# Patient Record
Sex: MALE | Race: AMERICAN INDIAN OR ALASKA NATIVE
[De-identification: names, ages, dates, MRNs, and addresses within clinical notes are randomized per-mention and may not be internally consistent; named-entity substitution may affect disease eponyms.]

---

## 2019-10-13 ENCOUNTER — HOSPITAL ENCOUNTER (EMERGENCY)
Dept: HOSPITAL 5 - ED | Age: 49
Discharge: LEFT BEFORE DISCHARGE | End: 2019-10-13
Payer: COMMERCIAL

## 2019-10-13 VITALS — DIASTOLIC BLOOD PRESSURE: 81 MMHG | SYSTOLIC BLOOD PRESSURE: 127 MMHG

## 2019-10-13 DIAGNOSIS — F17.200: ICD-10-CM

## 2019-10-13 DIAGNOSIS — F15.10: ICD-10-CM

## 2019-10-13 DIAGNOSIS — Z98.890: ICD-10-CM

## 2019-10-13 DIAGNOSIS — R07.89: Primary | ICD-10-CM

## 2019-10-13 DIAGNOSIS — F12.10: ICD-10-CM

## 2019-10-13 DIAGNOSIS — E11.9: ICD-10-CM

## 2019-10-13 LAB
BASOPHILS # (AUTO): 0 K/MM3 (ref 0–0.1)
BASOPHILS NFR BLD AUTO: 0.6 % (ref 0–1.8)
BUN SERPL-MCNC: 18 MG/DL (ref 9–20)
BUN/CREAT SERPL: 18 %
CALCIUM SERPL-MCNC: 9.3 MG/DL (ref 8.4–10.2)
EOSINOPHIL # BLD AUTO: 0.1 K/MM3 (ref 0–0.4)
EOSINOPHIL NFR BLD AUTO: 2.4 % (ref 0–4.3)
HCT VFR BLD CALC: 46.7 % (ref 35.5–45.6)
HEMOLYSIS INDEX: 17
HGB BLD-MCNC: 15.9 GM/DL (ref 11.8–15.2)
LYMPHOCYTES # BLD AUTO: 2.4 K/MM3 (ref 1.2–5.4)
LYMPHOCYTES NFR BLD AUTO: 38.8 % (ref 13.4–35)
MCHC RBC AUTO-ENTMCNC: 34 % (ref 32–34)
MCV RBC AUTO: 94 FL (ref 84–94)
MONOCYTES # (AUTO): 0.4 K/MM3 (ref 0–0.8)
MONOCYTES % (AUTO): 6.8 % (ref 0–7.3)
PLATELET # BLD: 295 K/MM3 (ref 140–440)
RBC # BLD AUTO: 4.98 M/MM3 (ref 3.65–5.03)

## 2019-10-13 PROCEDURE — 93005 ELECTROCARDIOGRAM TRACING: CPT

## 2019-10-13 PROCEDURE — 36415 COLL VENOUS BLD VENIPUNCTURE: CPT

## 2019-10-13 PROCEDURE — 83735 ASSAY OF MAGNESIUM: CPT

## 2019-10-13 PROCEDURE — 82550 ASSAY OF CK (CPK): CPT

## 2019-10-13 PROCEDURE — 71046 X-RAY EXAM CHEST 2 VIEWS: CPT

## 2019-10-13 PROCEDURE — 84484 ASSAY OF TROPONIN QUANT: CPT

## 2019-10-13 PROCEDURE — 80048 BASIC METABOLIC PNL TOTAL CA: CPT

## 2019-10-13 PROCEDURE — 93010 ELECTROCARDIOGRAM REPORT: CPT

## 2019-10-13 PROCEDURE — 85025 COMPLETE CBC W/AUTO DIFF WBC: CPT

## 2019-10-13 NOTE — XRAY REPORT
CHEST PA AND LATERAL VIEWS



INDICATION: 

Chest Pain.



COMPARISON: 

None.



FINDINGS:



Support devices: None.



Heart: Within normal limits. 



Lungs/Pleura: No acute pulmonary or pleural findings.  







IMPRESSION:

1. No significant abnormality.



Signer Name: Jesus Ridley MD 

Signed: 10/13/2019 6:45 PM

 Workstation Name: [id number]

## 2019-10-13 NOTE — EVENT NOTE
ED Screening Note


Date of service: 10/13/19


Time: 18:19


ED Screening Note: 





This is a 49 y.o. M. that presents to the ER with left sided chest pain x 1 

week.





Denies radiating pain, palpitations, sob, n/v, or weakness





This initial assessment/diagnostic orders/clinical plan/treatment(s) is/are s

ubject to change based on patients health status, clinical progression and re-

assessment by fellow clinical providers in the ED. Further treatment and workup 

at subsequent clinical providers discretion. Patient/guardian urged not to elope

from the ED as their condition may be serious if not clinically assessed and 

managed. 





Initial orders include: 





Labs, EKG, & CXR

## 2019-10-13 NOTE — EMERGENCY DEPARTMENT REPORT
ED Chest Pain HPI





- General


Chief Complaint: Chest Pain


Stated Complaint: CHEST PAIN


Time Seen by Provider: 10/13/19 18:19


Source: patient, RN notes reviewed


Mode of arrival: Ambulatory


Limitations: No Limitations





- History of Present Illness


Initial Comments: 





This is a 49-year-old gentleman.  This patient is not known to this provider 

previously.  He does not have a local primary care doctor.  He reports a history

of left nephrectomy when he was a child.  He presents to the ER with a complaint

of nontraumatic chest pain.  The chest pain is left-sided.  It does not radiate 

anywhere.  It is intermittent.  It is present for over a week.  There is no 

radiation to the back, arms or neck.  There is no vomiting or diaphoresis.  It 

is no recent aspirin consumption.  Patient works as a .  He endorses

multiple recent long road trips.  There is no leg pain or leg swelling.  He 

admits to recreational meth use a few days ago.  He denies recent drug 

ingestion.





Is no family history of heart disease that he is aware of.  There is no history 

of DVT or pulmonary embolism that he is aware of.





Apparently, patient came in today because he had a break in travel.


MD Complaint: chest pain


-: Gradual


Onset: during rest


Pain Location: left chest


Pain Radiation: none


Severity: mild


Quality: aching


Consistency: intermittent


Improves With: nothing


Worsens With: nothing


Context: recent travel


Aspirin use within the Past 7 Days: (0) No





- Related Data


                                    Allergies











Allergy/AdvReac Type Severity Reaction Status Date / Time


 


No Known Allergies Allergy   Unverified 10/13/19 18:06














Heart Score





- HEART Score


History: Slightly suspicious


EKG: Non-specific


Age: 45-65


Risk factors: 1-2 risk factors


Troponin: < normal limit


HEART Score: 3





- Critical Actions


Critical Actions: 0-3 pts:0.9-1.7%risk of adverse cardiac event.Candidate for 

discharge





ED Review of Systems


ROS: 


Stated complaint: CHEST PAIN


Other details as noted in HPI





Constitutional: denies: fever


Eyes: denies: eye discharge


ENT: denies: congestion


Respiratory: denies: cough, shortness of breath, SOB with exertion, SOB at rest,

wheezing


Cardiovascular: chest pain


Gastrointestinal: denies: vomiting


Musculoskeletal: denies: back pain


Skin: denies: lesions


Neurological: denies: weakness


Psychiatric: denies: anxiety


Hematological/Lymphatic: denies: easy bleeding





ED Past Medical Hx





- Past Medical History


Previous Medical History?: Yes


Hx Diabetes: Yes


Additional medical history: One kidney





- Surgical History


Past Surgical History?: Yes


Additional Surgical History: Left nephrectomy





- Social History


Smoking Status: Current Every Day Smoker


Substance Use Type: Marijuana, Methamphetamines





ED Physical Exam





- General


Limitations: No Limitations


General appearance: alert, in no apparent distress





- Head


Head exam: Present: atraumatic, normocephalic





- Eye


Eye exam: Present: normal appearance, EOMI.  Absent: nystagmus





- ENT


ENT exam: Present: normal exam, normal orophraynx, mucous membranes moist, 

normal external ear exam





- Neck


Neck exam: Present: normal inspection, full ROM.  Absent: tenderness, menin

gismus





- Respiratory


Respiratory exam: Present: normal lung sounds bilaterally.  Absent: respiratory 

distress





- Cardiovascular


Cardiovascular Exam: Present: regular rate, normal rhythm, normal heart sounds. 

Absent: bradycardia, tachycardia, irregular rhythm, systolic murmur, diastolic 

murmur, rubs, gallop





- GI/Abdominal


GI/Abdominal exam: Present: soft.  Absent: distended, tenderness, guarding, 

rebound, rigid, pulsatile mass





- Rectal


Rectal exam: Present: deferred





- Extremities Exam


Extremities exam: Present: normal inspection, full ROM, other (2+ pulses noted 

in the bilateral upper, lower extremities.  There is no long bone tenderness.  

Musculoskeletal compartments are soft.  The pelvis is stable.).  Absent: pedal 

edema, joint swelling, calf tenderness





- Back Exam


Back exam: Present: normal inspection, full ROM.  Absent: tenderness, CVA 

tenderness (R), CVA tenderness (L), paraspinal tenderness, vertebral tenderness





- Neurological Exam


Neurological exam: Present: alert, oriented X3, normal gait, other (there is no 

facial droop.  The tongue is midline.  Extraocular movements are intact 

bilaterally.  Patient speaking in full complete sentences.  Shoulder shrug is 

intact bilaterally.  Hearing is grossly intact bilaterally.  Visual acuity 

intact to finger counting and color perception at a close distance.  5/5 

strength 4 extremities.  Sensation intact to light touch in 4 extremities.).  

Absent: motor sensory deficit





- Psychiatric


Psychiatric exam: Present: normal affect, normal mood





- Skin


Skin exam: Present: warm, dry, intact, normal color.  Absent: rash





ED Course


                                   Vital Signs











  10/13/19 10/13/19 10/13/19





  18:06 20:54 21:01


 


Temperature 98.3 F  


 


Pulse Rate 85 70 81


 


Respiratory 16 15 12





Rate   


 


Blood Pressure 140/96  


 


O2 Sat by Pulse 100 98 96





Oximetry   














  10/13/19 10/13/19





  21:15 21:30


 


Temperature  


 


Pulse Rate 75 81


 


Respiratory 16 13





Rate  


 


Blood Pressure 134/86 127/81


 


O2 Sat by Pulse 97 97





Oximetry  














TENZIN score





- Tenzin Score


Age > 65: (0) No


Aspirin use within the Past 7 Days: (0) No


3 or more CAD Risk Factors: (0) No


2 or more Angina events in past 24 hrs: (0) No


Known CAD with more than 50% Stenosis: (0) No


Elevated Cardiac Markers: (0) No


ST Deviation Greater than 0.5mm: (0) No


TENZIN Score: 0





ED Medical Decision Making





- Lab Data


Result diagrams: 


                                 10/13/19 19:06





                                 10/13/19 19:06








                                   Vital Signs











  10/13/19 10/13/19 10/13/19





  18:06 20:54 21:01


 


Temperature 98.3 F  


 


Pulse Rate 85 70 81


 


Respiratory 16 15 12





Rate   


 


Blood Pressure 140/96  


 


O2 Sat by Pulse 100 98 96





Oximetry   














  10/13/19 10/13/19





  21:15 21:30


 


Temperature  


 


Pulse Rate 75 81


 


Respiratory 16 13





Rate  


 


Blood Pressure 134/86 127/81


 


O2 Sat by Pulse 97 97





Oximetry  











                                   Lab Results











  10/13/19 10/13/19 10/13/19 Range/Units





  19:06 19:06 21:43 


 


WBC  6.3    (4.5-11.0)  K/mm3


 


RBC  4.98    (3.65-5.03)  M/mm3


 


Hgb  15.9 H    (11.8-15.2)  gm/dl


 


Hct  46.7 H    (35.5-45.6)  %


 


MCV  94    (84-94)  fl


 


MCH  32    (28-32)  pg


 


MCHC  34    (32-34)  %


 


RDW  13.5    (13.2-15.2)  %


 


Plt Count  295    (140-440)  K/mm3


 


Lymph % (Auto)  38.8 H    (13.4-35.0)  %


 


Mono % (Auto)  6.8    (0.0-7.3)  %


 


Eos % (Auto)  2.4    (0.0-4.3)  %


 


Baso % (Auto)  0.6    (0.0-1.8)  %


 


Lymph #  2.4    (1.2-5.4)  K/mm3


 


Mono #  0.4    (0.0-0.8)  K/mm3


 


Eos #  0.1    (0.0-0.4)  K/mm3


 


Baso #  0.0    (0.0-0.1)  K/mm3


 


Seg Neutrophils %  51.4    (40.0-70.0)  %


 


Seg Neutrophils #  3.2    (1.8-7.7)  K/mm3


 


Sodium   138   (137-145)  mmol/L


 


Potassium   4.1   (3.6-5.0)  mmol/L


 


Chloride   103.0   ()  mmol/L


 


Carbon Dioxide   18 L   (22-30)  mmol/L


 


Anion Gap   21   mmol/L


 


BUN   18   (9-20)  mg/dL


 


Creatinine   1.0   (0.8-1.5)  mg/dL


 


Estimated GFR   > 60   ml/min


 


BUN/Creatinine Ratio   18   %


 


Glucose   114 H   ()  mg/dL


 


Calcium   9.3   (8.4-10.2)  mg/dL


 


Magnesium    2.20  (1.7-2.3)  mg/dL


 


Total Creatine Kinase    113  ()  units/L


 


Troponin T   < 0.010   (0.00-0.029)  ng/mL














  10/13/19 Range/Units





  Unknown 


 


WBC   (4.5-11.0)  K/mm3


 


RBC   (3.65-5.03)  M/mm3


 


Hgb   (11.8-15.2)  gm/dl


 


Hct   (35.5-45.6)  %


 


MCV   (84-94)  fl


 


MCH   (28-32)  pg


 


MCHC   (32-34)  %


 


RDW   (13.2-15.2)  %


 


Plt Count   (140-440)  K/mm3


 


Lymph % (Auto)   (13.4-35.0)  %


 


Mono % (Auto)   (0.0-7.3)  %


 


Eos % (Auto)   (0.0-4.3)  %


 


Baso % (Auto)   (0.0-1.8)  %


 


Lymph #   (1.2-5.4)  K/mm3


 


Mono #   (0.0-0.8)  K/mm3


 


Eos #   (0.0-0.4)  K/mm3


 


Baso #   (0.0-0.1)  K/mm3


 


Seg Neutrophils %   (40.0-70.0)  %


 


Seg Neutrophils #   (1.8-7.7)  K/mm3


 


Sodium   (137-145)  mmol/L


 


Potassium   (3.6-5.0)  mmol/L


 


Chloride   ()  mmol/L


 


Carbon Dioxide   (22-30)  mmol/L


 


Anion Gap   mmol/L


 


BUN   (9-20)  mg/dL


 


Creatinine   (0.8-1.5)  mg/dL


 


Estimated GFR   ml/min


 


BUN/Creatinine Ratio   %


 


Glucose   ()  mg/dL


 


Calcium   (8.4-10.2)  mg/dL


 


Magnesium   (1.7-2.3)  mg/dL


 


Total Creatine Kinase   ()  units/L


 


Troponin T  < 0.010  (0.00-0.029)  ng/mL














- EKG Data


-: EKG Interpreted by Me


EKG shows normal: sinus rhythm


Rate: normal





- EKG Data


When compared to previous EKG there are: previous EKG unavailable





10/13/19 23:08


There is no prior EKG available for comparison.  This is a sinus rhythm, 75 bpm,

normal axis, QTC is prolonged, there is poor R-wave progression, there is borde

rline high left ventricular voltage, EKG is abnormal, the EKG is not consistent 

with ST elevation myocardial infarction.





- Radiology Data


Radiology results: report reviewed, image reviewed





X-ray of the chest is negative for acute disease





- Medical Decision Making





Differential diagnosis, including not limited to: GERD, gastritis, hiatal 

hernia, pneumonia, acute coronary syndrome, pulmonary embolism











Assessment and plan: 49-year-old gentleman with 1 week of intermittent 

nonexertional atypical chest pain.  Troponin negative 1, EKG abnormal, but 

without prior for comparison.  Patient at low risk for major adverse cardiac 

event as per the heart scorer.  Not tachycardic, not hypoxic, not tachypneic, 

perc negative





Over, we recommended additional d-dimer, troponin, and repeat EKG to complete 

the patient's ER risk stratification.  The patient is alert and oriented 3, 

clinically sober, and exhibits decision making capacity.  He was counseled that 

we would need to obtain his additional tests to complete his risk stratification

to exclude potentially lethal condition.








Subsequently, the patient left.  We called him up, but he did not answer his 

telephone.  As per nursing report, voice mailbox is full and they were not able 

to leave a voicemail for return evaluation.


Critical care attestation.: 


If time is entered above; I have spent that time in minutes in the direct care 

of this critically ill patient, excluding procedure time.








ED Disposition


Clinical Impression: 


 History of chest pain





Disposition: Z-07 ELOPED


Is pt being admited?: No


Does the pt Need Aspirin: No


Condition: Undetermined


Referrals: 


PRIMARY CARE,MD [Primary Care Provider] - 3-5 Days